# Patient Record
Sex: MALE | Race: WHITE | NOT HISPANIC OR LATINO | URBAN - METROPOLITAN AREA
[De-identification: names, ages, dates, MRNs, and addresses within clinical notes are randomized per-mention and may not be internally consistent; named-entity substitution may affect disease eponyms.]

---
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
## 2024-07-15 NOTE — ED PEDIATRIC NURSE NOTE - CAS ELECT INFOMATION PROVIDED
[facility] Pre cath Consult Note    Total Time 60 minutes.This total time included the following components:   Reviewing patient's chart, Reviewing results, Obtaining a medical history, Performing a physical examination, Counseling patient, family member or caregiver, Ordering medications, tests, or procedures, Documenting clinical information in the EHR, Refer to or communicate with other health care professionals, Independently interpret results and Providing care coordination.  Patient was educated about chronic conditions like CAD, HTN, CHF, Atrial fibrillation, CKD, AAA, PAD, need long term follow up.  I discussed the patient's code status and the patient is a full code.     Cardiac catheterization If LV dysfunction, recurrent angina on antianginal Meds, abnormal stress test.   I discussed the risks, benefits, and alternatives of the coronary angiogram and/or left heart catheterization. Discussed with patient about indication, risk and benefits of Cath including risk of myocardial infarction, CVA, death, bleeding, hematoma, dye allergy, kidney failure, site infection, arterial pseudoaneurysm formation, arterio-venous fistula formation, and peripheral embolization.and in the event of interventional treatment, risk of restenosis and CABG surgery but not limited and patient understood this and all questions were answered. Importance of possible long-term Plavix therapy and life-threatening consequences of subacute thrombosis and bleeding complications d/w patient.  also discussed the possible outcomes including: normal findings, coronary artery disease amenable to percutaneous coronary intervention, and coronary artery disease needing surgical myocardial revascularization.  The patient was informed that additional, unanticipated procedures may be necessary during the procedure, based on my clinical judgement.  I discussed the possibility of using a closure device to close the arteriotomy if a  femoral artery approach is used.  I outlined the risks associated with closure device use, including, but not limited to: bleeding, site infection, arterial pseudoaneurysm formation, arterio-venous fistula formation, arterial injury, and clot formation.  I provided the patient with an opportunity to ask questions.  The patient gave informed, written consent.  Based on procedural findings, I will make further recommendations.    ASSESSMENT and PLAN:     Active Hospital Problems    Diagnosis     Typical atrial flutter  (CMD)     Coronary artery disease involving native coronary artery of native heart with unstable angina pectoris  (CMD)     Dyspnea on exertion     Abnormal cardiovascular stress test     Essential hypertension     Hypercholesterolemia     Varicose veins of both lower extremities with inflammation     Class 1 obesity due to excess calories with serious comorbidity and body mass index (BMI) of 33.0 to 33.9 in adult     DAVID on CPAP       (I25.110) Coronary artery disease involving native coronary artery of native heart with unstable angina pectoris  (CMD)  (R06.09) Dyspnea on exertion  New onset could be angina equivalent. NYHA Class III and  New atrial flutter.  (R94.39) Abnormal cardiovascular stress test- High risk  Anterior wall reversible defect with EF 36%  S/p cardiac catheterization and PCI 7/15/2024  Recommendation:  Long-term aspirin, P2 Y 12 platelet inhibitor as clinically indicated, beta-blocker therapy, moderate to high-dose statin therapy and ACE/ARB therapy  as clinically indicated.  Patient and family members were informed in detail about the results of cardiac catheterization,, importance of medical therapy compliance and regular follow-up with primary MD and cardiologist.  Life-threatening consequences of sudden platelet therapy withdrawal was informed and they verbalized understanding.  Lifestyle changes and risk factor modification education and emphasis made.  Right dominant  system.  Left main normal.  Left anterior descending artery proximal and mid 80% stenosis status post drug-eluting stent placementsx2.  Ramus intermedius very small size vessel.  Left circumflex artery free of obstructive disease  Right coronary artery free of obstructive disease  LV angiography:  No LV angiography done.  Hemodynamics:  Mildly elevated left ventricle end-diastolic pressure  No significant gradient across aortic valve    (I48.3) Typical atrial flutter  (CMD)  [id number] 4 HASBLED 2 With RVR  Ct Digoxin, ct Toprol and start Eliquis  Cardioversion in 4 weeks  D/W patient of indication risk and benefits of noninvasive evaluation, possible need for invasive w/u PCI, risk of CVA with atrial fibrillation and life-threatening bleeding complications with anticoagulation Rx. Indication, risk and benefits of cardioversion including risk of anesthesia and CVA d/w pt. Possible use of Sotalol, Tikosyn and Amiodarone its side effects and limitations were discussed. Anticoagulation medication education done. Possible EP evaluation for paroxysmal uncontrolled Atrial fibrillation ablation and evaluation for Watchman LA appendage closure informed.  Stop hydrochlorothiazide at present  S/p successful cardioversion using 200 biphasic joules on 7/15/2024    (I10) Essential hypertension  controlled.   Patient will continue beta-blocker, angiotensin converting enzyme inhibitor (ACE)/Angiotensin Receptor Blocker (ARB), and statins. Patient will monitor home blood pressure and call us if blood pressure is trending up.    (E78.00) Hypercholesterolemia  LDL 44, 7/2024  Diet low cholesterol, Diet for health.    Low Carb/Low Fat/Average protein Dietary education, regular exercise, continue smoking cessation education done. Regular f/u and lipid f/u to keep LDL,  with primary M.D.    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 Continue conservative therapy with compression stockings  30 mm hg for 3 months or more, leg elevation , NSAIDS and diuretics for pain and edema , avoidance of prolonged standing  Will proceed with ablation, phlebectomy, sclerotherapy after reviewing venous duplex.  Procedures explained to the patient, written and verbal instructions provided. Risks, complications, benefits discussed at length.  Patient received information and guidance about the above measures. Hand-out informational material and web site information were given     (G47.33) DAVID on CPAP  (E66.09,  Z68.33) Class 1 obesity due to excess calories with serious comorbidity and body mass index (BMI) of 33.0 to 33.9 in adult  BMI above normal and a follow-up plan is documented.    Discussed the effects obesity can have on overall health. Patients that are obese are at increased risk for type 2 diabetes, hypertension, dyslipidemia, obstructive sleep apnea, fatty liver disease, and metabolic syndrome. BMI management, weight reduction more than 10% of total body weight, therapeutic lifestyle changes discussed Education/advice/diet or observation. Low carb/Low Fat/Average protein Dietary education, regular exercise education done.    Medical compliance with plan discussed and risks of non-compliance reviewed.  Patient education completed on disease process, etiology & prognosis.  Proper usage and side effects of medications reviewed & discussed.    History and Physical:  No chief complaint on file.     Cait Cabral is a 76 year old female with a history of  has a past medical history of Arthritis, Atrial fibrillation  (CMD), Glaucoma, and Sleep apnea.  C/O Intermittent ALEXANDRA over last one weeks with less than one block/One flight of stairs daily activity. Above is associated with exertional chest pressure or heaviness and not  associated with palpitations or dizziness.  Above symptoms are gradually worsening in frequency, duration and intensity.  Has improved ALEXANDRA and Chest heaviness since on Betabl[employer]'s but gets ALEXANDRA and chest heaviness with less than 1 block walking. Has bilateral leg ache  No dyspnea on exertion with daily activity. No exertional typical chest pain, neck, jaw, or arm pain.  Taking medicine regularly and compliant with diet.  Review Of Symptoms:  CVS: No orthopnea, paroxysmal nocturnal dyspnea, or edema.  No palpitations, dizziness, syncope, or diaphoresis.  Peripheral Vascular disease: Denies intermittent claudications, restless leg syndrome, painful varicose veins.  GI: No history of epigastric pain, nausea, vomiting, hematemesis, or melena. No diarrhea or constipation.  Respiratory: No history of recent fever with chills, purulent expectoration, or hemoptysis.  No history of excessive snoring or day time sleepiness.  CNS: No history of headache, focal weakness, or visual disturbances.  MS: No joint pain, redness, or swelling.  Constitutional: No history of weight loss, loss of appetite, or weight gain.  : No history of flank pain or hematuria.  Endocrinology: No history of polyuria, polydipsia, polyphagia, or hot flashes.  ENT: No sore throat, difficulty swallowing, or other significant abnormalities.  Psych:  Adult Depression Screening: Result- Negative. Denies feeling down, depressed, or hopeless. Denies feeling little pleasure or interest in doing activities over last 2 weeks.   PHQ-2 Depression Total Score: 0. PHQ2 Interpretation: No further screening needed  Skin: no new rashes, lesions, or pruritic.    Cholesterol Status: 7/2024, recent labs done.    History reviewed. No pertinent family history.  Social History     Substance and Sexual Activity   Alcohol Use Yes    Comment: occ     Social History     Tobacco Use   Smoking Status Never   Smokeless Tobacco Never     Social History     Substance and Sexual  Activity   Drug Use Not on file     Past Medical History:   Diagnosis Date    Arthritis     knees    Atrial fibrillation  (CMD)     Glaucoma     Sleep apnea     uses cpap     Current Facility-Administered Medications   Medication Dose Route Frequency Provider Last Rate Last Admin    aspirin tablet 325 mg  325 mg Oral Once Tyree Rm MD        hydrALAZINE (APRESOLINE) injection 10 mg  10 mg Intravenous Q4H PRN Tyree Rm MD        sodium chloride 0.9 % flush bag 25 mL  25 mL Intravenous PRN Tyree Rm MD        sodium chloride 0.9 % injection 2 mL  2 mL Intracatheter 2 times per day Tyree Rm MD        fentaNYL (SUBLIMAZE) injection    PRN Tyree Rm MD   25 mcg at 07/15/24 0744    MIDazolam (VERSED) injection    PRN Tyree Rm MD   1 mg at 07/15/24 0744    lidocaine HCl (PF) 2 % PF injection    PRN Tyree Rm MD   5 mL at 07/15/24 0739    heparin (porcine) injection    PRN Tyree Rm MD   2,000 Units at 07/15/24 0750    nitroGLYCERIN 200 mcg/mL    PRN Tyree Rm MD   200 mcg at 07/15/24 0810    verapamil (ISOPTIN IV) injection    PRN Tyree Rm MD   2.5 mg at 07/15/24 0742    clopidogrel (PLAVIX) tablet    PRN Tyree Rm MD   600 mg at 07/15/24 0814    sodium chloride (NORMAL SALINE) 0.9 % bolus    Continuous PRN Tyree Rm MD   100 mL at 07/15/24 0823    acetaminophen (TYLENOL) tablet 650 mg  650 mg Oral Q4H PRN Tyree Rm MD        Or    acetaminophen (TYLENOL) suppository 650 mg  650 mg Rectal Q4H PRN Tyree Rm MD        [START ON 7/16/2024] aspirin chewable 81 mg  81 mg Oral Daily Tyree Rm MD        [START ON 7/16/2024] clopidogrel (PLAVIX) tablet 75 mg  75 mg Oral Daily Tyree Rm MD        cloNIDine (CATAPRES) tablet 0.1 mg  0.1 mg Oral Q4H PRN Tyree Rm MD        hydrALAZINE (APRESOLINE) injection 10 mg  10 mg Intravenous Q4H PRN Tyree Rm MD        nitroGLYCERIN (NITROSTAT)  sublingual tablet 0.4 mg  0.4 mg Sublingual Q5 Min PRN Tyree Rm MD        sodium chloride 0.9 % flush bag 25 mL  25 mL Intravenous PRN Tyree Rm MD        sodium chloride 0.9 % injection 2 mL  2 mL Intracatheter 2 times per day Tyree Rm MD        sodium chloride 0.9% infusion   Intravenous Continuous Tyree Rm  mL/hr at 07/15/24 0845 Infusion Continues to Floor at 07/15/24 0845    iohexol (OMNIPAQUE 350 INJECT) contrast solution    PRN Tyree Rm MD   110 mL at 07/15/24 0829     Physical Exam:    Visit Vitals  BP (!) 158/89 (BP Location: LUE - Left upper extremity, Patient Position: Sitting)   Pulse 71   Temp 97.3 °F (36.3 °C) (Temporal)   Resp 16   Ht 5' 3\" (1.6 m)   Wt 85.9 kg (189 lb 6 oz)   SpO2 95%   BMI 33.55 kg/m²     PRE-SEDATION ASSESSMENT    CONSENT  Consent for procedure and sedation obtained: Yes    MEDICAL HISTORY  Significant medical/surgical history: Yes  Past Complications with Sedation/Anesthesia: No  Significant Family History: No  Smoking History: No  Alcohol/Drug abuse: No  Possible Pregnancy (LMP): No  Cardiac History: Yes  Respiratory History: Yes    PHYSICAL EXAM  History and Physical Reviewed: Patient has valid H&P within 30 days. I have reviewed and there are no changes.  Airway Anatomy : Class II  Heart : Normal  Lungs : Normal  LOC/Mental Status : Normal    OTHER FINDINGS    SEDATION RISK ASSESSMENT    American Society of Anesthesiologists (ASA) A Physical Status Classifications  (For emergency operations, add the letter E after the classification)        ASA 1 A normal healthy patient, (Healthy, non smoker, no or minimal alcohol use).   ASA 2 A patient with a mild systemic disease, (Mild diseases, no substantive functional limitations. E.g. obesity, smoker, occasional drinker, well controlled DM/HTN).   ASA 3 A patient with severe systemic disease (Substantive functional Limitations. One or more moderate to severe diseases. E.g. COPD,  uncontrolled DM/HTN, morbid obesity, pacemaker, CAD, CVA).   ASA 4 A patient with severe systemic disease that is a constant threat to life  (Recent MI, CVA, or TIA (<3 months), ESRD, ongoing cardiac ischemia, DIC, ARDS, low ejection fraction).    ASA 5 A moribund patient who is not expected to survive without the operation (Ruptured aorta, massive trauma, multiple organ/systems pathology).   ASA 6 A brain dead patient for organ harvesting.      Risk Status ASA: Class III   Plan for Sedation: Moderate Sedation  EKG Monitoring: Yes    NARRATIVE FINDINGS  Risk Status ASA: Class III   Narrative Findings: Mallampati Class II     [place] Study of Health and Aging Clinical Frailty Score:    Well/Managing Well.    Time out performed prior to the commencement of the procedure to verify the patient and the procedure to be performed. Cath lab RN and  technician in attendance at the time of the time out.    POST PROCEDURE- POST-SEDATION ASSESSMENT:  -----------------------------------------------------------------------------  Tolerated procedure well.    Pre procedure diagnosis: CAD    Post procedure Diagnosis: Status post drug-eluting stent placement proximal and mid left anterior descending artery.    Type of sedation: Moderate sedation    Estimated Blood Loss:  10 ml    Specimen Removed:  N/A    Complications: None    Referral to Outpatient Cardiac Rehab and patient education benefits for Myocardial Infarction, Percutaneous Transluminal Coronary Angioplasty, Heart Valve Replacement, Coronary Stent, Heart Valve Repair, Coronary Artery Bypass Surgery, Heart Failure, Angina Pectoris, Heart or Heart-Lung Transplant:  Yes    HEENT: No icterus. No cyanosis. No thyromegaly. Normal carotid upstroke. No carotid bruit.  JVD 4 cm above angle of Ronnie in 45 degree position.  RESPIRATORY:      Air entry bilaterally equal. No rales, rub or wheezing.  CARDIO VASCULAR :     PMI in the 5th left intercostals space 9 cm from the midsternal  line.  S1 N, A2 N, P2 N, S3 No. No mid systolic click.  II/VI ESM Mitral area present, Tricuspid area present, Aortic area   -, Pulmonary area -.  No pericardial rub. No chest pain on arm Movement or deep breathing.  Abdomen:     Soft. No hepatosplenomegaly. No palpable or pulsatile mass felt. Bowel sound present.  CNS:    Alert and Oriented x3. No focal motor deficit.  Psych: Normal mood.  Musculoskeletal:    No significant Kyphosis/Scoliosis. Gait not tested.  EXTREMITIES:    NO Edema. No clubbing or cyanosis. DP/PT 1+/2 Bilat. Femoral 2/2 Bilat. No Bruit.  No Brachial femoral delay.  No radial artery puncture site bleeding or hematoma.  Skin:  No Xanthelasma.       Tyree Rm MD, FACC, CPE    DC instructions

## 2024-08-23 ENCOUNTER — NON-APPOINTMENT (OUTPATIENT)
Age: 19
End: 2024-08-23

## 2024-09-05 ENCOUNTER — NON-APPOINTMENT (OUTPATIENT)
Age: 19
End: 2024-09-05

## 2025-04-22 ENCOUNTER — EMERGENCY (EMERGENCY)
Facility: HOSPITAL | Age: 20
LOS: 1 days | End: 2025-04-22
Attending: EMERGENCY MEDICINE | Admitting: EMERGENCY MEDICINE
Payer: COMMERCIAL

## 2025-04-22 VITALS
DIASTOLIC BLOOD PRESSURE: 93 MMHG | HEART RATE: 98 BPM | RESPIRATION RATE: 18 BRPM | TEMPERATURE: 98 F | SYSTOLIC BLOOD PRESSURE: 149 MMHG | WEIGHT: 139.99 LBS | OXYGEN SATURATION: 100 %

## 2025-04-22 DIAGNOSIS — F32.A DEPRESSION, UNSPECIFIED: ICD-10-CM

## 2025-04-22 PROCEDURE — 99283 EMERGENCY DEPT VISIT LOW MDM: CPT

## 2025-04-22 PROCEDURE — 90792 PSYCH DIAG EVAL W/MED SRVCS: CPT

## 2025-04-22 NOTE — ED BEHAVIORAL HEALTH ASSESSMENT NOTE - SUMMARY
Patient is a 20 y/o male, single, noncaregiver, domiciled with parents and sister, employed as a  at [employer], w/ [employer] of depression and anxiety; currently in outpatient treatment w/ psychiatrist ( Dr Brizuela #[phone]), recently started seeing new therapist (unknown name) at [facility], currently prescribed Effexor and Buspar, 1 past inpt admission on 9/20/21 at [facility] for "obsessive-compulsive outbursts and SIB," no past suicide attempts, + past hx of SIB, no h/o violence or legal issues, no PMH, no substance use and no hx of abuse, referred from virtual intake appointment with psychiatrist Dr. Malave at [facility]. Patient is a 18 y/o male, single, noncaregiver, domiciled with parents and sister, employed as a  at [employer], w/ [employer] of depression and anxiety; currently in outpatient treatment w/ psychiatrist ( Dr Brizuela #[phone]), recently started seeing new therapist (unknown name) at [facility], currently prescribed Effexor and Buspar, 1 past inpt admission on 9/20/21 at [facility] for "obsessive-compulsive outbursts and SIB," no past suicide attempts, + past hx of SIB, no h/o violence or legal issues, no PMH, no substance use and no hx of abuse, referred from virtual intake appointment with psychiatrist Dr. Malave at [facility].  Though pt reports intermittent active SI over the past few months, he adamantly denies any intent or plan. He is future-oriented, identifies reasons for living, and engages in safety planning. He is caring for himself. He is not manic or psychotic. He is calm and in good behavioral control. He does not present an acute danger to self or others and does not meet criteria for involuntary psychiatric admission at this time. He declines voluntary psychiatric admission at this time.

## 2025-04-22 NOTE — ED BEHAVIORAL HEALTH ASSESSMENT NOTE - HPI (INCLUDE ILLNESS QUALITY, SEVERITY, DURATION, TIMING, CONTEXT, MODIFYING FACTORS, ASSOCIATED SIGNS AND SYMPTOMS)
Patient is a 20 y/o male, single, noncaregiver, domiciled with parents and sister, employed as a  at [employer], w/ [employer] of depression and anxiety; currently in outpatient treatment w/ psychiatrist ( Dr Brizuela #[phone]), recently started seeing new therapist (unknown name) at [facility], currently prescribed Effexor and Buspar, 1 past inpt admission on 9/20/21 at [facility] for "obsessive-compulsive outbursts and SIB," no past suicide attempts, + past hx of SIB, no h/o violence or legal issues, no PMH, no substance use and no hx of abuse, referred from virtual intake [facility] with [facility].    On interview, pt states he was seeing a psychiatrist, Dr. Brizuela, for about 5 years. Last saw him a few months ago. Pt recently started seeing a new therapist (unknown name) through [facility]. Pt's mother thought it would be a good idea for his psychiatrist to be at the same location. He had first appointment today with psychiatrist (Dr. Malave) from [facility]. States she sent him to the ED because he reported SI. Over the past few months, he has been experiencing intermittent active SI with no plan or intent. He has also been having intermittent passive SI. Most recently had active SI (with no plan or intent) today. Most recently had passive SI a few days ago.     See  note for collateral from pt's mother.  Writer called Dr. Malave at [phone]. Left voicemail message. Patient is a 18 y/o male, single, noncaregiver, domiciled with parents and sister, employed as a  at [employer], w/ [employer] of depression and anxiety; currently in outpatient treatment w/ psychiatrist ( Dr Brizuela #[phone]), recently started seeing new [facility] (unknown name) [facility], currently prescribed Effexor and Buspar, 1 past inpt admission on 9/20/21 at [facility] for "obsessive-compulsive outbursts and SIB," no past suicide attempts, + past hx of SIB, no h/o violence or legal issues, no PMH, no substance use and no hx of abuse, referred from virtual intake appointment with psychiatrist Dr. Malave at [facility].    On interview, pt states he was seeing a psychiatrist, Dr. Brizuela, for about 5 years. Last saw him a few months ago. Pt recently started seeing a new therapist (unknown name) through [facility]. Pt's mother thought it would be a good idea for his psychiatrist to be at the same location. He had first appointment today with psychiatrist (Dr. Malave) from [facility]. States she sent him to the ED because he reported SI. Over the past few months, he has been experiencing intermittent active SI with no plan or intent. He has also been having intermittent passive SI. Most recently had active SI (with no plan or intent) today. Most recently had passive SI a few days ago. Pt cites work as primary stressor. States [employer] is very understaffed and he is working a lot and working erratic hours. He reports increased depression and anxiety as a result. He reports some difficulty sleeping, sometimes has low energy. He denies anhedonia, denies hopelessness. States he would never try to kill himself, stating he has strong family support and has goals for his future (ie he wants to have a career and get his own place). He denies homicidal or violent ideation, intent, or plan. He denies manic or psychotic symptoms. He reports full compliance with prescribed Effexor and Buspar. He denies substance use.     See  note for collateral from pt's mother.   Writer called Dr. Malave at [phone]. Left voicemail message.

## 2025-04-22 NOTE — ED PROVIDER NOTE - NSFOLLOWUPINSTRUCTIONS_ED_ALL_ED_FT
YOU WERE SEEN FOR DEPRESSION    YOU HAVE BEEN DIAGNOSED WITH DEPRESSION    REST AND PLENTY OF FLUIDS    CONTINUE YOUR CURRENT MEDICATION    FOLLOW UP WITH YOUR PSYCHIATRIST    FOLLOW UP WITH YOUR PRIMARY CARE PROVIDER WITHIN 1 WEEK.    RETURN TO THE EMERGENCY DEPARTMENT FOR ANY WORSENING SYMPTOMS.

## 2025-04-22 NOTE — ED BEHAVIORAL HEALTH ASSESSMENT NOTE - SAFETY PLAN ADDT'L DETAILS
Safety plan discussed with.../Education provided regarding environmental safety / lethal means restriction/Provision of National Suicide Prevention Lifeline 8-855-081-EWIE (5650)

## 2025-04-22 NOTE — ED BEHAVIORAL HEALTH ASSESSMENT NOTE - DETAILS
none Discussed locking up/removing dangerous items from home, including but not limited to weapons, knives, prescription and non prescription medications etc. Parent agreed. Parent and patient advised and agreed to return to ED or call 911 for any worsening symptoms. Writer called Dr. Malave at [phone]. Left voicemail message; writer informed mother of dispo call 911 or return to ED if symptoms worsen or if pt develops thoughts of harming self or others deferred in ED see HPI

## 2025-04-22 NOTE — ED BEHAVIORAL HEALTH ASSESSMENT NOTE - NSSUICRSKFACTOR_PSY_ALL_CORE
Presenting Symptoms/Activating Events/Stressors Current and Past Psychiatric Diagnoses/Presenting Symptoms/Activating Events/Stressors

## 2025-04-22 NOTE — ED BEHAVIORAL HEALTH ASSESSMENT NOTE - DESCRIPTION
patient is a 17 year old male in 11th grade, attending [employer] school and is domiciled with parents and sister in private residence. none calm, in good behavioral control throughout ED course; pt did not require prn meds or restraints  Vital Signs Last 24 Hrs  T(C): 36.7 (22 Apr 2025 11:12), Max: 36.7 (22 Apr 2025 11:12)  T(F): 98 (22 Apr 2025 11:12), Max: 98 (22 Apr 2025 11:12)  HR: 98 (22 Apr 2025 11:12) (98 - 98)  BP: 149/93 (22 Apr 2025 11:12) (149/93 - 149/93)  BP(mean): --  RR: 18 (22 Apr 2025 11:12) (18 - 18)  SpO2: 100% (22 Apr 2025 11:12) (100% - 100%)    Parameters below as of 22 Apr 2025 11:12  Patient On (Oxygen Delivery Method): room air see HPI

## 2025-04-22 NOTE — ED BEHAVIORAL HEALTH ASSESSMENT NOTE - REFERRAL / APPOINTMENT DETAILS
back to current providers, therapist appt this week follow-up appointment with Dr Brizuela tomorrow (4/23/25) at 3:00 PM

## 2025-04-22 NOTE — ED BEHAVIORAL HEALTH ASSESSMENT NOTE - RISK ASSESSMENT
although patient has a hx of NSSIB and intermittent suicidal ideation, no past suicide attempts, denied current SI/HI, plan, intent, future oriented, engaged in safety planning, compliant with treatment  Hi protective factors include supportive family, seeking help, undergoing tx, future oriented , no prior SAs and denies active or passive s/H/I/P now. Patient is at chronically elevated risk of harm to self given h/o self-harm, past psych admission, intermittent active SI, depression, psychosocial stressors.    Protective factors include no suicide attempts, no violence history, medication compliance, no access to guns, no substance abuse, supportive family, willingness to seek help, no suicidal plan or intent, no homicidal ideation, future-oriented, identifies reasons for living.   Pt is not at acutely elevated risk of harm to self or others.

## 2025-04-22 NOTE — ED BEHAVIORAL HEALTH NOTE - BEHAVIORAL HEALTH NOTE
Writer called patient's mother  states she can't talk now as she's speaking with a doctor and disconnected the call. Writer called patient's mother  who provided the following information.  Patient resides with mother, his sister and mother's .  Patient is not in school, works at [employer], having problems at work past few weeks due to scheduling.  States he's working too much, hours are erratic, not been able to negotiate his hours.  Patient lately is not sleeping enough due to working long hours and being over scheduled.  Patient was able to schedule less hours yesterday, but new schedule doesn't take effect until 2 weeks.  Patient is diagnosed with depression and anxiety.  Has a psychiatrist Dr Del Valle  x 5 years.  [facility] patient started speaking to a therapist yesterday and today started talking a new psychiatrist today for the first time to see if he wanted to change doctors.  Pt's mother states she was just looking to increase patient's services by finding him this new psychiatrist but states maybe she will let Dr. Del Valle take the lead on patient's treatment.    [facility] patient presented to the ED today because he had a phone consultation today with the new doctor, and they called 911 to the home.  [facility] patient is at baseline dramatic, eloquent, and an artistic speaker which this new doctor may have misinterpreted.  Patient uses big words to get his point across.  She does not want patient admitted to the hospital.  Between her  and herself will be with him 24/7.  [facility] patient has a strong support system at home and will be safe.  Has a follow up with Dr. Del Valle tomorrow at 3pm.  Patient has one prior psychiatric admission in 2020 for one week due to being dysregulated and being destructive at home, but states, " he's much much better now than he was then".  No access to guns at home. No history of suicide attempts.  No drug or alcohol use. Current medications: Venlafaxine 37.5mg QD, Buspar 5mg QD

## 2025-04-22 NOTE — ED PROVIDER NOTE - CLINICAL SUMMARY MEDICAL DECISION MAKING FREE TEXT BOX
20 y/o male with PMHx of Depression who presented to the ED for worsening depression with SI X wks.   Concern for depression with passive SI  Will get Psych consult

## 2025-04-22 NOTE — ED PROVIDER NOTE - PATIENT PORTAL LINK FT
You can access the FollowMyHealth Patient Portal offered by [facility] by registering at the following website: [email or web address]. By joining [facility]’s FollowMyHealth portal, you will also be able to view your health information using other applications (apps) compatible with our system.

## 2025-04-22 NOTE — ED BEHAVIORAL HEALTH ASSESSMENT NOTE - CURRENT MEDICATION
Lexapro 10 mg q HS, Effexor 75 mg qD  Risperidone - titrating down due to increased prolactin Effexor XR 75 mg po daily, Buspar 5 mg po daily

## 2025-04-22 NOTE — ED BEHAVIORAL HEALTH ASSESSMENT NOTE - NSSUICPROTFACT_PSY_ALL_CORE
Responsibility to children, family, or others/Identifies reasons for living/Supportive social network of family or friends/Fear of death or the actual act of killing self/Engaged in work or school/Positive therapeutic relationships/Beloved pets Responsibility to children, family, or others/Identifies reasons for living/Supportive social network of family or friends/Fear of death or the actual act of killing self/Engaged in work or school/Positive therapeutic relationships

## 2025-04-22 NOTE — ED PROVIDER NOTE - OBJECTIVE STATEMENT
18 y/o male with PMHx of Depression who presented to the ED for worsening depression with SI X wks. Pt states he was at his psychiatrist today and told them he has had some increased stress with his job and having worsening depression with SI over the past few weeks. Pt denies any specific plan. Pt denies any fever, chills, nausea, vomiting, SOB, chest pain, or abd pain. Pt denies any HI or hallucinations.

## 2025-04-22 NOTE — ED ADULT NURSE NOTE - OBJECTIVE STATEMENT
Received pt to , JIE+Ox4, ambulatory. brought in by EMS, activated by pt's therapist for SI, depression. 20G to AC, Labs sent, Medicated as per Provider orders, plan of care ongoing, no further concerns as of present, patient expresses no other needs at this time, call light within reach. Received pt to , A+Ox4, ambulatory. brought in by EMS, activated by pt's therapist for SI, depression. 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 pt is calm and cooperative, pt denies HI, Auditory/visual hallucinations, ETOh/drug use. Respirations even and unlabored, normal work of breathing, no accessory muscle use, speaking in full clear uninterrupted sentences. Pt denies any chest pain, SOB, headache, dizziness, N+V, diarrhea, fever, chills. plan of care ongoing.

## 2025-04-22 NOTE — ED BEHAVIORAL HEALTH ASSESSMENT NOTE - OTHER PAST PSYCHIATRIC HISTORY (INCLUDE DETAILS REGARDING ONSET, COURSE OF ILLNESS, INPATIENT/OUTPATIENT TREATMENT)
previous psychiatric hospitalization in [facility] 9/20/2021 for "obsessive-compulsive outbursts and SIB."  currently in outpatient treatment w/ therapist and psychiatrist.   previous diagnosed with depression and anxiety. see HPI

## 2025-04-22 NOTE — ED ADULT NURSE REASSESSMENT NOTE - NS ED NURSE REASSESS COMMENT FT1
Evaluated and cleared by psychiatry, medically cleared by provider.  Pt remains awake, calm and cooperative. At baseline mental status. Pt denies SI/HI, auditory/visual hallucinations. d/c instructions and mother pick of for d/c home.

## 2025-04-22 NOTE — ED BEHAVIORAL HEALTH ASSESSMENT NOTE - NSACTIVEVENT_PSY_ALL_CORE
Perceived burden on family or others Triggering events leading to humiliation, shame, and/or despair (e.g., Loss of relationship, financial or health status) (real or anticipated)

## 2025-04-22 NOTE — ED PROVIDER NOTE - CONSTITUTIONAL, MLM
No tearful episodes during interaction and continues to report that her depression and anxiety have decreased  Denies SI and states that she is remorseful of suicide attempt  States that it is "ok" with her if she has to stay until next week  Happy that she is able to make the phone calls she needs to make without her  being present  Compliant with medications  Will monitor  normal... Well appearing, awake, alert, oriented to person, place, time/situation and in no apparent distress.